# Patient Record
Sex: MALE | Race: WHITE | NOT HISPANIC OR LATINO | URBAN - METROPOLITAN AREA
[De-identification: names, ages, dates, MRNs, and addresses within clinical notes are randomized per-mention and may not be internally consistent; named-entity substitution may affect disease eponyms.]

---

## 2018-08-02 ENCOUNTER — OFFICE VISIT (OUTPATIENT)
Dept: URBAN - METROPOLITAN AREA CLINIC 29 | Facility: CLINIC | Age: 68
End: 2018-08-02
Payer: COMMERCIAL

## 2018-08-02 PROCEDURE — 99213 OFFICE O/P EST LOW 20 MIN: CPT | Performed by: OPTOMETRIST

## 2018-08-02 RX ORDER — BRINZOLAMIDE 10 MG/ML
1 % SUSPENSION/ DROPS OPHTHALMIC
Qty: 1 | Refills: 8 | Status: INACTIVE
Start: 2018-08-02 | End: 2019-01-16

## 2018-08-02 ASSESSMENT — INTRAOCULAR PRESSURE
OD: 15
OS: 14

## 2018-08-02 NOTE — IMPRESSION/PLAN
Impression: Primary open-angle glaucoma, bilateral, moderate stage: H40.1132 OU. Condition: established, stable. Plan: Discussed diagnosis in detail with patient. Discussed treatment options with patient. No change to current treatment. Will continue to observe condition and or symptoms. Continue using current medication(s). Medication refill given today.

## 2018-09-05 ENCOUNTER — OFFICE VISIT (OUTPATIENT)
Dept: URBAN - METROPOLITAN AREA CLINIC 29 | Facility: CLINIC | Age: 68
End: 2018-09-05
Payer: COMMERCIAL

## 2018-09-05 DIAGNOSIS — H27.10 DISLOCATED LENS: Primary | ICD-10-CM

## 2018-09-05 PROCEDURE — 99203 OFFICE O/P NEW LOW 30 MIN: CPT | Performed by: OPHTHALMOLOGY

## 2018-09-05 PROCEDURE — 99214 OFFICE O/P EST MOD 30 MIN: CPT | Performed by: OPHTHALMOLOGY

## 2018-09-05 ASSESSMENT — INTRAOCULAR PRESSURE
OD: 13
OS: 14

## 2018-09-05 NOTE — IMPRESSION/PLAN
Impression: Dislocated lens: H27.10. OD. Condition: new problem addtl w/u needed. Plan: to Dr WALKER for care soon.

## 2018-09-07 ENCOUNTER — OFFICE VISIT (OUTPATIENT)
Dept: URBAN - METROPOLITAN AREA CLINIC 23 | Facility: CLINIC | Age: 68
End: 2018-09-07
Payer: COMMERCIAL

## 2018-09-07 DIAGNOSIS — H43.311 VITREOUS MEMBRANES AND STRANDS, RIGHT EYE: ICD-10-CM

## 2018-09-07 DIAGNOSIS — T85.22XA DISPLACEMENT OF INTRAOCULAR LENS, INITIAL ENCOUNTER: Primary | ICD-10-CM

## 2018-09-07 PROCEDURE — 92014 COMPRE OPH EXAM EST PT 1/>: CPT | Performed by: OPHTHALMOLOGY

## 2018-09-07 PROCEDURE — 92134 CPTRZ OPH DX IMG PST SGM RTA: CPT | Performed by: OPHTHALMOLOGY

## 2018-09-07 RX ORDER — OFLOXACIN 3 MG/ML
0.3 % SOLUTION/ DROPS OPHTHALMIC
Qty: 5 | Refills: 3 | Status: INACTIVE
Start: 2018-09-07 | End: 2018-10-03

## 2018-09-07 RX ORDER — DUREZOL 0.5 MG/ML
0.05 % EMULSION OPHTHALMIC
Qty: 5 | Refills: 5 | Status: INACTIVE
Start: 2018-09-07 | End: 2018-10-03

## 2018-09-07 ASSESSMENT — INTRAOCULAR PRESSURE
OD: 13
OS: 14

## 2018-09-07 NOTE — IMPRESSION/PLAN
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 OD. Condition: unstable. Vision: vision affected. Plan: Discussed diagnosis in detail with patient. Recommend surgery OD - see notes above.

## 2018-09-07 NOTE — IMPRESSION/PLAN
Impression: Displacement of intraocular lens, initial encounter: T85.22xA. OD. Condition: unstable. Vision: vision affected. Hx of  Cat sx OD in 1996 approximately and Repair of RD OD w/gas in 2001. Plan: Discussed diagnosis in detail with patient. Discussed options: observation or surgery for removal of dislocation lens and patient can wear a Contact Lens or surgery to remove dislocated IOL and replace with new lens TSPCIOL. Patient elects to proceed with surgery. Surgical treatment is required to remove dislocated IOL, recommend TSPCIOL. Discussed surgery in great length. Surgical risks and benefits were discussed, explained and understood by patient. All questions answered. RL1. OCT OD appears stable. Erx'd Durezol and Ofloxacin to the pharmacy. Will schedule A scan (distance correction desire by patient).

## 2018-09-10 ENCOUNTER — OFFICE VISIT (OUTPATIENT)
Dept: URBAN - METROPOLITAN AREA CLINIC 29 | Facility: CLINIC | Age: 68
End: 2018-09-10
Payer: COMMERCIAL

## 2018-09-10 PROCEDURE — 76519 ECHO EXAM OF EYE: CPT | Performed by: OPTOMETRIST

## 2018-10-02 ENCOUNTER — SURGERY (OUTPATIENT)
Dept: URBAN - METROPOLITAN AREA SURGERY 11 | Facility: SURGERY | Age: 68
End: 2018-10-02
Payer: COMMERCIAL

## 2018-10-02 PROCEDURE — 66985 INSERT LENS PROSTHESIS: CPT | Performed by: OPHTHALMOLOGY

## 2018-10-02 PROCEDURE — 67041 VIT FOR MACULAR PUCKER: CPT | Performed by: OPHTHALMOLOGY

## 2018-10-03 ENCOUNTER — POST-OPERATIVE VISIT (OUTPATIENT)
Dept: URBAN - METROPOLITAN AREA CLINIC 29 | Facility: CLINIC | Age: 68
End: 2018-10-03

## 2018-10-03 PROCEDURE — 99024 POSTOP FOLLOW-UP VISIT: CPT | Performed by: OPTOMETRIST

## 2018-10-03 RX ORDER — OFLOXACIN 3 MG/ML
0.3 % SOLUTION/ DROPS OPHTHALMIC
Qty: 5 | Refills: 3 | Status: INACTIVE
Start: 2018-10-03 | End: 2018-10-10

## 2018-10-03 RX ORDER — DUREZOL 0.5 MG/ML
0.05 % EMULSION OPHTHALMIC
Qty: 5 | Refills: 5 | Status: INACTIVE
Start: 2018-10-03 | End: 2019-01-16

## 2018-10-03 ASSESSMENT — INTRAOCULAR PRESSURE
OS: 15
OD: 17

## 2018-10-17 ENCOUNTER — POST-OPERATIVE VISIT (OUTPATIENT)
Dept: URBAN - METROPOLITAN AREA CLINIC 29 | Facility: CLINIC | Age: 68
End: 2018-10-17

## 2018-10-17 PROCEDURE — 99024 POSTOP FOLLOW-UP VISIT: CPT | Performed by: OPTOMETRIST

## 2018-10-17 ASSESSMENT — INTRAOCULAR PRESSURE
OS: 14
OD: 20

## 2018-11-08 ENCOUNTER — POST-OPERATIVE VISIT (OUTPATIENT)
Dept: URBAN - METROPOLITAN AREA CLINIC 23 | Facility: CLINIC | Age: 68
End: 2018-11-08

## 2018-11-08 DIAGNOSIS — Z09 ENCNTR FOR F/U EXAM AFT TRTMT FOR COND OTH THAN MALIG NEOPLM: Primary | ICD-10-CM

## 2018-11-08 PROCEDURE — 99024 POSTOP FOLLOW-UP VISIT: CPT | Performed by: OPHTHALMOLOGY

## 2018-11-08 ASSESSMENT — INTRAOCULAR PRESSURE
OD: 16
OS: 14

## 2018-12-14 ENCOUNTER — POST-OPERATIVE VISIT (OUTPATIENT)
Dept: URBAN - METROPOLITAN AREA CLINIC 23 | Facility: CLINIC | Age: 68
End: 2018-12-14

## 2018-12-14 PROCEDURE — 99024 POSTOP FOLLOW-UP VISIT: CPT | Performed by: OPHTHALMOLOGY

## 2018-12-14 ASSESSMENT — INTRAOCULAR PRESSURE
OS: 15
OD: 27

## 2019-01-16 ENCOUNTER — OFFICE VISIT (OUTPATIENT)
Dept: URBAN - METROPOLITAN AREA CLINIC 29 | Facility: CLINIC | Age: 69
End: 2019-01-16
Payer: COMMERCIAL

## 2019-01-16 DIAGNOSIS — H52.223 REGULAR ASTIGMATISM, BILATERAL: ICD-10-CM

## 2019-01-16 PROCEDURE — 99213 OFFICE O/P EST LOW 20 MIN: CPT | Performed by: OPTOMETRIST

## 2019-01-16 RX ORDER — BRINZOLAMIDE 10 MG/ML
1 % SUSPENSION/ DROPS OPHTHALMIC
Qty: 1 | Refills: 8 | Status: INACTIVE
Start: 2019-01-16 | End: 2019-04-16

## 2019-01-16 RX ORDER — BRINZOLAMIDE 10 MG/ML
1 % SUSPENSION/ DROPS OPHTHALMIC
Qty: 3 | Refills: 4 | Status: INACTIVE
Start: 2019-01-16 | End: 2019-03-18

## 2019-01-16 ASSESSMENT — VISUAL ACUITY
OD: 20/20
OS: 20/20

## 2019-01-16 ASSESSMENT — INTRAOCULAR PRESSURE
OS: 15
OD: 23

## 2019-01-16 NOTE — IMPRESSION/PLAN
Impression: Primary open-angle glaucoma, bilateral, moderate stage: H40.1132 OU. Plan: Discussed diagnosis in detail with patient. Discussed treatment options with patient. No change to current treatment. Will continue to observe condition and or symptoms. Continue using current medication(s). Medication refill given today. Emphasized and explained compliance. New medication(s) Rx given today.

## 2019-02-13 ENCOUNTER — OFFICE VISIT (OUTPATIENT)
Dept: URBAN - METROPOLITAN AREA CLINIC 29 | Facility: CLINIC | Age: 69
End: 2019-02-13
Payer: COMMERCIAL

## 2019-02-13 DIAGNOSIS — H40.1132 PRIMARY OPEN-ANGLE GLAUCOMA, BILATERAL, MODERATE STAGE: Primary | ICD-10-CM

## 2019-02-13 PROCEDURE — 99213 OFFICE O/P EST LOW 20 MIN: CPT | Performed by: OPTOMETRIST

## 2019-02-13 RX ORDER — NETARSUDIL 0.2 MG/ML
0.02 % SOLUTION/ DROPS OPHTHALMIC; TOPICAL
Qty: 0 | Refills: 0 | Status: INACTIVE
Start: 2019-02-13 | End: 2019-03-19

## 2019-02-13 ASSESSMENT — INTRAOCULAR PRESSURE
OD: 27
OS: 14

## 2019-02-13 NOTE — IMPRESSION/PLAN
Impression: Primary open-angle glaucoma, bilateral, moderate stage: H40.1132 OU. Condition: established, worsening. Symptoms: IOP is unstable OD. Plan: Discussed diagnosis in detail with patient. Discussed treatment options with patient. Continue using current medication(s). New medication(s) Rx given today OD.

## 2019-03-19 ENCOUNTER — OFFICE VISIT (OUTPATIENT)
Dept: URBAN - METROPOLITAN AREA CLINIC 29 | Facility: CLINIC | Age: 69
End: 2019-03-19
Payer: COMMERCIAL

## 2019-03-19 PROCEDURE — 92014 COMPRE OPH EXAM EST PT 1/>: CPT | Performed by: OPHTHALMOLOGY

## 2019-03-19 PROCEDURE — 76514 ECHO EXAM OF EYE THICKNESS: CPT | Performed by: OPHTHALMOLOGY

## 2019-03-19 PROCEDURE — 92133 CPTRZD OPH DX IMG PST SGM ON: CPT | Performed by: OPHTHALMOLOGY

## 2019-03-19 PROCEDURE — 92020 GONIOSCOPY: CPT | Performed by: OPHTHALMOLOGY

## 2019-03-19 RX ORDER — NETARSUDIL 0.2 MG/ML
0.02 % SOLUTION/ DROPS OPHTHALMIC; TOPICAL
Qty: 1 | Refills: 11 | Status: INACTIVE
Start: 2019-03-19 | End: 2019-04-16

## 2019-03-19 ASSESSMENT — INTRAOCULAR PRESSURE
OS: 17
OD: 35

## 2019-03-19 NOTE — IMPRESSION/PLAN
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 CCT average OU--Enlarged cupping OU -
IOP elevated OD, stable OS -- Plan: Discussed diagnosis, explained and understood by patient. Discussed IOP/ONH/Glaucoma management and risks. OCT ordered, performed and reviewed. Start azopt bid ou and combigan tid od. Start rhopressa qhs od, sample given. Discussed side effects of glaucoma meds. Will continue to monitor condition and symptoms.

## 2019-04-16 ENCOUNTER — OFFICE VISIT (OUTPATIENT)
Dept: URBAN - METROPOLITAN AREA CLINIC 29 | Facility: CLINIC | Age: 69
End: 2019-04-16
Payer: COMMERCIAL

## 2019-04-16 PROCEDURE — 99213 OFFICE O/P EST LOW 20 MIN: CPT | Performed by: OPHTHALMOLOGY

## 2019-04-16 RX ORDER — NETARSUDIL 0.2 MG/ML
0.02 % SOLUTION/ DROPS OPHTHALMIC; TOPICAL
Qty: 1 | Refills: 11 | Status: INACTIVE
Start: 2019-04-16 | End: 2020-06-26

## 2019-04-16 RX ORDER — PREDNISOLONE ACETATE 10 MG/ML
1 % SUSPENSION/ DROPS OPHTHALMIC
Qty: 1 | Refills: 0 | Status: INACTIVE
Start: 2019-04-16 | End: 2019-09-24

## 2019-04-16 RX ORDER — BRINZOLAMIDE 10 MG/ML
1 % SUSPENSION/ DROPS OPHTHALMIC
Qty: 1 | Refills: 11 | Status: INACTIVE
Start: 2019-04-16 | End: 2019-09-23

## 2019-04-16 ASSESSMENT — INTRAOCULAR PRESSURE
OD: 28
OS: 21

## 2019-04-16 NOTE — IMPRESSION/PLAN
Impression: Pigmentary glaucoma, bilateral, moderate stage: O67.5405. CCT average OU Enlarged cupping OU IOP borderline OU Plan: Discussed diagnosis, explained and understood by patient. Discussed IOP/ONH/Glaucoma management and risks. Continue azopt TID OD, QD OS  combigan tid od, and rhopressa qhs od. Will continue to monitor condition and symptoms. Recommend Trabeculoplasty (ALT) OD to possibly lower IOP. Patient elects ALT . Discussed RBA's of laser trabeculoplasty . RL=2 start prednisolone qid x 7 days after laser procedure.

## 2019-06-05 ENCOUNTER — SURGERY (OUTPATIENT)
Dept: URBAN - METROPOLITAN AREA SURGERY 11 | Facility: SURGERY | Age: 69
End: 2019-06-05
Payer: COMMERCIAL

## 2019-06-05 PROCEDURE — 65855 TRABECULOPLASTY LASER SURG: CPT | Performed by: OPHTHALMOLOGY

## 2019-06-14 ENCOUNTER — POST-OPERATIVE VISIT (OUTPATIENT)
Dept: URBAN - METROPOLITAN AREA CLINIC 29 | Facility: CLINIC | Age: 69
End: 2019-06-14

## 2019-06-14 PROCEDURE — 99024 POSTOP FOLLOW-UP VISIT: CPT | Performed by: OPHTHALMOLOGY

## 2019-06-14 ASSESSMENT — INTRAOCULAR PRESSURE
OD: 20
OS: 19

## 2019-07-30 ENCOUNTER — OFFICE VISIT (OUTPATIENT)
Dept: URBAN - METROPOLITAN AREA CLINIC 29 | Facility: CLINIC | Age: 69
End: 2019-07-30
Payer: COMMERCIAL

## 2019-07-30 PROCEDURE — 99213 OFFICE O/P EST LOW 20 MIN: CPT | Performed by: OPHTHALMOLOGY

## 2019-07-30 ASSESSMENT — INTRAOCULAR PRESSURE
OD: 22
OS: 17

## 2019-07-30 NOTE — IMPRESSION/PLAN
Impression: Pigmentary glaucoma, bilateral, moderate stage: H52.5448. CCT average OU --ONH stable ou  -  IOP OS doing well - 
IOP borderline OD -- ALT OD 6/5/19 -  Plan: Discussed diagnosis, explained and understood by patient. Discussed IOP/ONH/Glaucoma management and risks. Continue azopt TID OD, azopt QD OS,  combigan tid od, and rhopressa qhs od. Will continue to monitor condition and symptoms.

## 2019-09-24 ENCOUNTER — OFFICE VISIT (OUTPATIENT)
Dept: URBAN - METROPOLITAN AREA CLINIC 29 | Facility: CLINIC | Age: 69
End: 2019-09-24
Payer: COMMERCIAL

## 2019-09-24 DIAGNOSIS — H40.1332 PIGMENTARY GLAUCOMA, BILATERAL, MODERATE STAGE: Primary | ICD-10-CM

## 2019-09-24 PROCEDURE — 99213 OFFICE O/P EST LOW 20 MIN: CPT | Performed by: OPHTHALMOLOGY

## 2019-09-24 RX ORDER — BRINZOLAMIDE 10 MG/ML
1 % SUSPENSION/ DROPS OPHTHALMIC
Qty: 10 | Refills: 4 | Status: INACTIVE
Start: 2019-09-24 | End: 2020-06-22

## 2019-09-24 ASSESSMENT — INTRAOCULAR PRESSURE
OS: 16
OD: 21

## 2019-09-24 NOTE — IMPRESSION/PLAN
Impression: Pigmentary glaucoma, bilateral, moderate stage: I41.9619. CCT average OU ALT OD 6/5/19 ONH/IOP stable OU Plan: Discussed diagnosis, explained and understood by patient. Discussed IOP/ONH/Glaucoma management and risks. Continue azopt TID OD, azopt QD OS,  combigan bid ou, and rhopressa qhs od. Will continue to monitor condition and symptoms.

## 2020-03-03 ENCOUNTER — OFFICE VISIT (OUTPATIENT)
Dept: URBAN - METROPOLITAN AREA CLINIC 29 | Facility: CLINIC | Age: 70
End: 2020-03-03
Payer: COMMERCIAL

## 2020-03-03 PROCEDURE — 99214 OFFICE O/P EST MOD 30 MIN: CPT | Performed by: OPHTHALMOLOGY

## 2020-03-03 PROCEDURE — 92083 EXTENDED VISUAL FIELD XM: CPT | Performed by: OPHTHALMOLOGY

## 2020-03-03 ASSESSMENT — INTRAOCULAR PRESSURE
OD: 17
OS: 18

## 2020-03-03 NOTE — IMPRESSION/PLAN
Impression: Pigmentary glaucoma, bilateral, moderate stage: F89.6033. CCT average OU ALT OD 6/5/19 ONH/IOP stable OU Plan: Discussed diagnosis, explained and understood by patient. Discussed IOP/ONH/Glaucoma management and risks. Continue Azopt TID OD, Azopt QD OS,  Combigan bid ou, and Rhopressa qhs od. Will continue to monitor condition and symptoms.

## 2020-07-16 ENCOUNTER — OFFICE VISIT (OUTPATIENT)
Dept: URBAN - METROPOLITAN AREA CLINIC 29 | Facility: CLINIC | Age: 70
End: 2020-07-16
Payer: COMMERCIAL

## 2020-07-16 PROCEDURE — 92133 CPTRZD OPH DX IMG PST SGM ON: CPT | Performed by: OPHTHALMOLOGY

## 2020-07-16 PROCEDURE — 99214 OFFICE O/P EST MOD 30 MIN: CPT | Performed by: OPHTHALMOLOGY

## 2020-07-16 ASSESSMENT — INTRAOCULAR PRESSURE
OD: 21
OS: 17

## 2020-07-16 NOTE — IMPRESSION/PLAN
Impression: Pigmentary glaucoma, bilateral, moderate stage: W05.7096. CCT average OU ALT OD 6/5/19 ONH/IOP stable OU Plan: Discussed diagnosis, explained and understood by patient. Discussed IOP/ONH/Glaucoma management and risks. IOP stable OS, OD slightly increased today. Discussed we may need to consider laser tx if IOP OD continues to increase. patient understands compliance is important. Continue Azopt TID OD, Azopt QD OS,  Combigan bid ou, and Rhopressa qhs od. Will continue to monitor condition and symptoms.

## 2020-11-10 ENCOUNTER — OFFICE VISIT (OUTPATIENT)
Dept: URBAN - METROPOLITAN AREA CLINIC 29 | Facility: CLINIC | Age: 70
End: 2020-11-10
Payer: COMMERCIAL

## 2020-11-10 PROCEDURE — 99213 OFFICE O/P EST LOW 20 MIN: CPT | Performed by: OPHTHALMOLOGY

## 2020-11-10 ASSESSMENT — INTRAOCULAR PRESSURE
OD: 14
OS: 25

## 2020-11-10 NOTE — IMPRESSION/PLAN
Impression: Pigmentary glaucoma, bilateral, moderate stage: G45.3711. CCT average OU ALT OD 6/5/19 ONH/IOP stable OU Plan: Discussed diagnosis, explained and understood by patient. Discussed IOP/ONH/Glaucoma management and risks. IOP stable OD, OS slightly increased today. Discussed we may need to consider laser tx if IOP OS vs start Rhopressa OS IOP continues to increase. Patient understands compliance is important. Continue Azopt TID OD, Azopt QD OS,  Combigan bid ou, and rec start and continue Rhopressa qhs OD and start OS -sample given today . Will continue to monitor condition and symptoms.

## 2020-12-08 ENCOUNTER — OFFICE VISIT (OUTPATIENT)
Dept: URBAN - METROPOLITAN AREA CLINIC 29 | Facility: CLINIC | Age: 70
End: 2020-12-08
Payer: COMMERCIAL

## 2020-12-08 PROCEDURE — 99213 OFFICE O/P EST LOW 20 MIN: CPT | Performed by: OPHTHALMOLOGY

## 2020-12-08 RX ORDER — NETARSUDIL 0.2 MG/ML
0.02 % SOLUTION/ DROPS OPHTHALMIC; TOPICAL
Qty: 2.5 | Refills: 11 | Status: INACTIVE
Start: 2020-12-08 | End: 2021-12-21

## 2020-12-08 ASSESSMENT — INTRAOCULAR PRESSURE
OD: 15
OS: 15

## 2020-12-08 NOTE — IMPRESSION/PLAN
Impression: Pigmentary glaucoma, bilateral, moderate stage: Q78.3730. CCT average OU ALT OD 6/5/19 ONH/IOP stable OU Plan: Discussed diagnosis, explained and understood by patient. Discussed IOP/ONH/Glaucoma management and risks. IOP stable OU, Recommend patient to continue with Rhopressa as it seems to be effective in decreasing his IOP. Patient understands compliance is important. Continue Azopt TID OD, Azopt QD OS,  Combigan bid ou and continue Rhopressa qhs OU, patient accepts and understands. Will continue to monitor condition and symptoms.

## 2021-04-22 ENCOUNTER — OFFICE VISIT (OUTPATIENT)
Dept: URBAN - METROPOLITAN AREA CLINIC 29 | Facility: CLINIC | Age: 71
End: 2021-04-22
Payer: COMMERCIAL

## 2021-04-22 PROCEDURE — 92083 EXTENDED VISUAL FIELD XM: CPT | Performed by: OPHTHALMOLOGY

## 2021-04-22 PROCEDURE — 99214 OFFICE O/P EST MOD 30 MIN: CPT | Performed by: OPHTHALMOLOGY

## 2021-04-22 ASSESSMENT — INTRAOCULAR PRESSURE
OS: 14
OD: 17

## 2021-04-22 NOTE — IMPRESSION/PLAN
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 CCT average OU ALT OD 6/5/19 ONH/IOP elevated OD, stable OS Plan: Discussed diagnosis, explained and understood by patient. Discussed IOP/ONH/Glaucoma management and risks. Visual field ordered and reviewed today shows progression OD, no change OS. Continue rhopressa qhs ou, azopt tid od, qd os, and combigan bid ou. Discussed adding drops vs laser. Patient elects SLT. Recommend Trabeculoplasty (SLT) OD to possibly lower IOP. Discussed RBA's of laser trabeculoplasty .  RL=2

## 2021-05-21 ENCOUNTER — SURGERY (OUTPATIENT)
Dept: URBAN - METROPOLITAN AREA SURGERY 11 | Facility: SURGERY | Age: 71
End: 2021-05-21
Payer: COMMERCIAL

## 2021-05-21 PROCEDURE — 65855 TRABECULOPLASTY LASER SURG: CPT | Performed by: OPHTHALMOLOGY

## 2021-06-25 ENCOUNTER — OFFICE VISIT (OUTPATIENT)
Dept: URBAN - METROPOLITAN AREA CLINIC 29 | Facility: CLINIC | Age: 71
End: 2021-06-25
Payer: COMMERCIAL

## 2021-06-25 DIAGNOSIS — H40.1321 PIGMENTARY GLAUCOMA, LEFT EYE, MILD STAGE: ICD-10-CM

## 2021-06-25 DIAGNOSIS — H40.1312 PIGMENTARY GLAUCOMA, RIGHT EYE, MODERATE STAGE: Primary | ICD-10-CM

## 2021-06-25 PROCEDURE — 99213 OFFICE O/P EST LOW 20 MIN: CPT | Performed by: OPHTHALMOLOGY

## 2021-06-25 ASSESSMENT — INTRAOCULAR PRESSURE
OD: 14
OS: 14

## 2021-06-25 NOTE — IMPRESSION/PLAN
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 CCT average OU ALT OD 6/5/19 ONH/IOP elevated OD, stable OS
s/p SLT OD 05/21/2021 Plan: Discussed diagnosis, explained and understood by patient. Discussed IOP/ONH/Glaucoma management and risks. Exam and IOP stable OU , 
Continue rhopressa qhs ou, azopt tid od, qd os, and combigan bid ou. We will continue to monitor.

## 2021-12-06 ENCOUNTER — OFFICE VISIT (OUTPATIENT)
Dept: URBAN - METROPOLITAN AREA CLINIC 23 | Facility: CLINIC | Age: 71
End: 2021-12-06
Payer: COMMERCIAL

## 2021-12-06 PROCEDURE — 99214 OFFICE O/P EST MOD 30 MIN: CPT | Performed by: OPHTHALMOLOGY

## 2021-12-06 PROCEDURE — 92133 CPTRZD OPH DX IMG PST SGM ON: CPT | Performed by: OPHTHALMOLOGY

## 2021-12-06 ASSESSMENT — INTRAOCULAR PRESSURE
OD: 12
OS: 18

## 2021-12-06 NOTE — IMPRESSION/PLAN
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 CCT average OU ALT OD 6/5/19 ONH/IOP elevated OD, stable OS
s/p SLT OD 05/21/2021 Plan: Discussed diagnosis, explained and understood by patient. Discussed IOP/ONH/Glaucoma management and risks. Continue rhopressa qhs ou, azopt tid od, qd os, and combigan bid ou. Will continue to monitor condition and symptoms.

## 2022-04-26 ENCOUNTER — OFFICE VISIT (OUTPATIENT)
Dept: URBAN - METROPOLITAN AREA CLINIC 28 | Facility: CLINIC | Age: 72
End: 2022-04-26
Payer: COMMERCIAL

## 2022-04-26 DIAGNOSIS — H40.1321 PIGMENTARY GLAUCOMA, LEFT EYE, MILD STAGE: ICD-10-CM

## 2022-04-26 DIAGNOSIS — H40.1312 PIGMENTARY GLAUCOMA, RIGHT EYE, MODERATE STAGE: Primary | ICD-10-CM

## 2022-04-26 DIAGNOSIS — H40.1332 PIGMENTARY GLAUCOMA, BILATERAL, MODERATE STAGE: ICD-10-CM

## 2022-04-26 PROCEDURE — 99214 OFFICE O/P EST MOD 30 MIN: CPT | Performed by: OPHTHALMOLOGY

## 2022-04-26 PROCEDURE — 92083 EXTENDED VISUAL FIELD XM: CPT | Performed by: OPHTHALMOLOGY

## 2022-04-26 ASSESSMENT — INTRAOCULAR PRESSURE
OS: 16
OD: 13

## 2022-04-26 NOTE — IMPRESSION/PLAN
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 CCT average OU ALT OD 6/5/19 ONH/IOP elevated OD, stable OS
s/p SLT OD 05/21/2021 Plan: Discussed diagnosis, explained and understood by patient. Discussed IOP/ONH/Glaucoma management and risks. Continue rhopressa qhs ou, azopt tid od, qd os, and combigan bid ou. IOP stable OU . Will continue to monitor condition and symptoms.

## 2022-08-16 ENCOUNTER — OFFICE VISIT (OUTPATIENT)
Dept: URBAN - METROPOLITAN AREA CLINIC 28 | Facility: CLINIC | Age: 72
End: 2022-08-16
Payer: MEDICARE

## 2022-08-16 DIAGNOSIS — H40.1321 PIGMENTARY GLAUCOMA, LEFT EYE, MILD STAGE: ICD-10-CM

## 2022-08-16 DIAGNOSIS — H40.1312 PIGMENTARY GLAUCOMA, RIGHT EYE, MODERATE STAGE: Primary | ICD-10-CM

## 2022-08-16 PROCEDURE — 99213 OFFICE O/P EST LOW 20 MIN: CPT | Performed by: OPHTHALMOLOGY

## 2022-08-16 ASSESSMENT — INTRAOCULAR PRESSURE
OS: 16
OD: 13

## 2022-08-16 NOTE — IMPRESSION/PLAN
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 CCT average OU ALT OD 6/5/19 ONH/IOP Stable OU
SLT OD 05/21/2021 Plan: Discussed diagnosis, explained and understood by patient. Discussed IOP/ONH/Glaucoma management and risks. Continue rhopressa qhs ou, azopt tid od, qd os, and combigan bid ou. Will continue to monitor condition and symptoms.

## 2022-12-06 ENCOUNTER — OFFICE VISIT (OUTPATIENT)
Dept: URBAN - METROPOLITAN AREA CLINIC 28 | Facility: CLINIC | Age: 72
End: 2022-12-06
Payer: MEDICARE

## 2022-12-06 DIAGNOSIS — H40.1312 PIGMENTARY GLAUCOMA, RIGHT EYE, MODERATE STAGE: Primary | ICD-10-CM

## 2022-12-06 DIAGNOSIS — H40.1321 PIGMENTARY GLAUCOMA, LEFT EYE, MILD STAGE: ICD-10-CM

## 2022-12-06 PROCEDURE — 99214 OFFICE O/P EST MOD 30 MIN: CPT | Performed by: OPHTHALMOLOGY

## 2022-12-06 PROCEDURE — 92133 CPTRZD OPH DX IMG PST SGM ON: CPT | Performed by: OPHTHALMOLOGY

## 2022-12-06 ASSESSMENT — INTRAOCULAR PRESSURE
OS: 13
OD: 12